# Patient Record
Sex: FEMALE | URBAN - METROPOLITAN AREA
[De-identification: names, ages, dates, MRNs, and addresses within clinical notes are randomized per-mention and may not be internally consistent; named-entity substitution may affect disease eponyms.]

---

## 2020-09-24 ENCOUNTER — APPOINTMENT (RX ONLY)
Dept: URBAN - METROPOLITAN AREA CLINIC 125 | Facility: CLINIC | Age: 20
Setting detail: DERMATOLOGY
End: 2020-09-24

## 2020-09-24 DIAGNOSIS — Z79.899 OTHER LONG TERM (CURRENT) DRUG THERAPY: ICD-10-CM

## 2020-09-24 DIAGNOSIS — T50.905 ADVERSE EFFECT OF UNSPECIFIED DRUGS, MEDICAMENTS AND BIOLOGICAL SUBSTANCES: ICD-10-CM | Status: INADEQUATELY CONTROLLED

## 2020-09-24 DIAGNOSIS — L01.03 BULLOUS IMPETIGO: ICD-10-CM | Status: INADEQUATELY CONTROLLED

## 2020-09-24 PROBLEM — T50.905A ADVERSE EFFECT OF UNSPECIFIED DRUGS, MEDICAMENTS AND BIOLOGICAL SUBSTANCES, INITIAL ENCOUNTER: Status: ACTIVE | Noted: 2020-09-24

## 2020-09-24 PROBLEM — L08.9 LOCAL INFECTION OF THE SKIN AND SUBCUTANEOUS TISSUE, UNSPECIFIED: Status: ACTIVE | Noted: 2020-09-24

## 2020-09-24 PROCEDURE — ? TREATMENT REGIMEN

## 2020-09-24 PROCEDURE — ? COUNSELING

## 2020-09-24 PROCEDURE — ? PRESCRIPTION

## 2020-09-24 PROCEDURE — 99203 OFFICE O/P NEW LOW 30 MIN: CPT

## 2020-09-24 RX ORDER — TRIAMCINOLONE ACETONIDE 1 MG/G
CREAM TOPICAL BID
Qty: 1 | Refills: 2 | Status: ERX | COMMUNITY
Start: 2020-09-24

## 2020-09-24 RX ORDER — DOXYCYCLINE HYCLATE 100 MG/1
CAPSULE, GELATIN COATED ORAL
Qty: 60 | Refills: 0 | Status: ERX | COMMUNITY
Start: 2020-09-24

## 2020-09-24 RX ADMIN — DOXYCYCLINE HYCLATE: 100 CAPSULE, GELATIN COATED ORAL at 00:00

## 2020-09-24 RX ADMIN — TRIAMCINOLONE ACETONIDE: 1 CREAM TOPICAL at 00:00

## 2020-09-24 ASSESSMENT — LOCATION ZONE DERM
LOCATION ZONE: LEG
LOCATION ZONE: TRUNK

## 2020-09-24 ASSESSMENT — LOCATION SIMPLE DESCRIPTION DERM
LOCATION SIMPLE: RIGHT LOWER BACK
LOCATION SIMPLE: RIGHT PRETIBIAL REGION

## 2020-09-24 ASSESSMENT — LOCATION DETAILED DESCRIPTION DERM
LOCATION DETAILED: RIGHT PROXIMAL PRETIBIAL REGION
LOCATION DETAILED: RIGHT SUPERIOR MEDIAL LOWER BACK

## 2020-09-24 NOTE — HPI: RASH
What Type Of Note Output Would You Prefer (Optional)?: Standard Output
Is The Patient Presenting As Previously Scheduled?: No, they are a work-in
How Severe Is Your Rash?: severe
Is This A New Presentation, Or A Follow-Up?: Rash
Additional History: Rash started two weeks ago with a little blister in her right pop fossa. She felt ill and went home from work to sleep off the illness. When she woke up, the redness had spread and she went to the ER. The blister had grown in size as well. The urgent care started her on ibuprofen, bactrim and cephalexin. 2 days later the swelling got worse and she reports the pain to be a 6 out of 10. She saw her PCP and was told to go to the ER to get admitted. She was admitted to [facility] and was released two days ago. While she was in the hospital, she was put on IV antibiotics (unknown name). She states no tests were run to figure out what she has. She saw a wound care specialist after she was released from the hospital. They obtained cultures which are still pending. She states she has been itchy all  over since yesterday.

## 2020-09-24 NOTE — PROCEDURE: TREATMENT REGIMEN
Plan: Discussed diagnosis and outcome with patient and mother at length and in detail. Await cultures from wound care
Detail Level: Simple
Initiate Treatment: Elevate legs above heart level\\nChange Bactrim to Doxy due to possible allergic reaction
Continue Regimen: Wound care
Plan: Discussed that the itching is most likely an allergic drug reaction. It is hard to pin point which new medication is causing it since multiple where started at the same time. Highly recommend discontinuing Bactrim to avoid SJS, and most other medications as well. Follow up on Monday. 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 She denies sores in the mouth or painful urination. Call office immediately with new symptoms
Initiate Treatment: Doxycycline 100mg take one tablet twice daily\\nTylenol or Aleve for pain management (take together)\\nZyrtec twice daily\\nTriamcinolone 0.1% cream twice daily\\nCool compresses to settle itching
Discontinue Regimen: Lorazepam\\nIbuprofen\\nGabapentin\\nBactrim\\nHydrocodone
Continue Regimen: Ok to continue Vitamin D2 and Levothyroxine

## 2020-09-28 ENCOUNTER — APPOINTMENT (RX ONLY)
Dept: URBAN - METROPOLITAN AREA CLINIC 125 | Facility: CLINIC | Age: 20
Setting detail: DERMATOLOGY
End: 2020-09-28

## 2020-09-28 DIAGNOSIS — Z79.899 OTHER LONG TERM (CURRENT) DRUG THERAPY: ICD-10-CM

## 2020-09-28 DIAGNOSIS — T50.905 ADVERSE EFFECT OF UNSPECIFIED DRUGS, MEDICAMENTS AND BIOLOGICAL SUBSTANCES: ICD-10-CM | Status: IMPROVED

## 2020-09-28 DIAGNOSIS — L01.03 BULLOUS IMPETIGO: ICD-10-CM | Status: IMPROVED

## 2020-09-28 PROBLEM — L08.9 LOCAL INFECTION OF THE SKIN AND SUBCUTANEOUS TISSUE, UNSPECIFIED: Status: ACTIVE | Noted: 2020-09-28

## 2020-09-28 PROBLEM — T50.905A ADVERSE EFFECT OF UNSPECIFIED DRUGS, MEDICAMENTS AND BIOLOGICAL SUBSTANCES, INITIAL ENCOUNTER: Status: ACTIVE | Noted: 2020-09-28

## 2020-09-28 PROCEDURE — ? COUNSELING

## 2020-09-28 PROCEDURE — 99214 OFFICE O/P EST MOD 30 MIN: CPT

## 2020-09-28 PROCEDURE — ? TREATMENT REGIMEN

## 2020-09-28 ASSESSMENT — LOCATION ZONE DERM
LOCATION ZONE: TRUNK
LOCATION ZONE: LEG

## 2020-09-28 ASSESSMENT — LOCATION SIMPLE DESCRIPTION DERM
LOCATION SIMPLE: RIGHT PRETIBIAL REGION
LOCATION SIMPLE: RIGHT LOWER BACK

## 2020-09-28 NOTE — PROCEDURE: TREATMENT REGIMEN
Plan: Advised pt to hold off on OTC antiseptic wound wash.\\n\\nPt states she will be seeing her wound care specialist at [facility] on Wednesday, 9/30. We will send copy of our notes to their office.
Detail Level: Simple
Initiate Treatment: Domeboro soaks BID for ~15 mins then apply thin layer of mupirocin 2% ointment with Telfa pads
Continue Regimen: Wound care\\nElevate legs above heart level\\nDoxycycline 100mg 1 PO BID
Otc Regimen: Benadryl 1 PO QHS PRN to help with sleep
Discontinue Regimen: Lorazepam\\nIbuprofen\\nGabapentin\\nBactrim\\nHydrocodone
Continue Regimen: Ok to continue Vitamin D2 and Levothyroxine\\nDoxycycline 100mg take one tablet twice daily\\nTylenol or Aleve for pain management (take together)\\nZyrtec twice daily\\nTriamcinolone 0.1% cream twice daily\\nCool compresses to settle itching